# Patient Record
Sex: MALE | Race: WHITE
[De-identification: names, ages, dates, MRNs, and addresses within clinical notes are randomized per-mention and may not be internally consistent; named-entity substitution may affect disease eponyms.]

---

## 2018-02-15 ENCOUNTER — HOSPITAL ENCOUNTER (OUTPATIENT)
Dept: HOSPITAL 62 - ER | Age: 53
Setting detail: OBSERVATION
LOS: 1 days | Discharge: HOME | End: 2018-02-16
Attending: FAMILY MEDICINE | Admitting: FAMILY MEDICINE
Payer: OTHER GOVERNMENT

## 2018-02-15 DIAGNOSIS — F32.9: ICD-10-CM

## 2018-02-15 DIAGNOSIS — R20.0: ICD-10-CM

## 2018-02-15 DIAGNOSIS — I25.2: ICD-10-CM

## 2018-02-15 DIAGNOSIS — G47.33: ICD-10-CM

## 2018-02-15 DIAGNOSIS — Z90.49: ICD-10-CM

## 2018-02-15 DIAGNOSIS — R20.2: ICD-10-CM

## 2018-02-15 DIAGNOSIS — Z82.49: ICD-10-CM

## 2018-02-15 DIAGNOSIS — I25.110: ICD-10-CM

## 2018-02-15 DIAGNOSIS — E78.1: ICD-10-CM

## 2018-02-15 DIAGNOSIS — H57.02: ICD-10-CM

## 2018-02-15 DIAGNOSIS — E66.01: ICD-10-CM

## 2018-02-15 DIAGNOSIS — F41.9: ICD-10-CM

## 2018-02-15 DIAGNOSIS — Z79.899: ICD-10-CM

## 2018-02-15 DIAGNOSIS — G45.9: Primary | ICD-10-CM

## 2018-02-15 DIAGNOSIS — Z79.02: ICD-10-CM

## 2018-02-15 DIAGNOSIS — Z79.82: ICD-10-CM

## 2018-02-15 DIAGNOSIS — F43.10: ICD-10-CM

## 2018-02-15 DIAGNOSIS — R27.8: ICD-10-CM

## 2018-02-15 DIAGNOSIS — I67.9: ICD-10-CM

## 2018-02-15 DIAGNOSIS — Z95.5: ICD-10-CM

## 2018-02-15 DIAGNOSIS — Z86.73: ICD-10-CM

## 2018-02-15 LAB
ADD MANUAL DIFF: NO
ALBUMIN SERPL-MCNC: 4.8 G/DL (ref 3.5–5)
ALP SERPL-CCNC: 82 U/L (ref 38–126)
ALT SERPL-CCNC: 47 U/L (ref 21–72)
ANION GAP SERPL CALC-SCNC: 13 MMOL/L (ref 5–19)
APTT BLD: 26.5 SEC (ref 23.5–35.8)
APTT BLD: 26.9 SEC (ref 23.5–35.8)
AST SERPL-CCNC: 31 U/L (ref 17–59)
BASOPHILS # BLD AUTO: 0 10^3/UL (ref 0–0.2)
BASOPHILS NFR BLD AUTO: 0.6 % (ref 0–2)
BILIRUB DIRECT SERPL-MCNC: 0.2 MG/DL (ref 0–0.4)
BILIRUB SERPL-MCNC: 0.7 MG/DL (ref 0.2–1.3)
BUN SERPL-MCNC: 18 MG/DL (ref 7–20)
CALCIUM: 10 MG/DL (ref 8.4–10.2)
CHLORIDE SERPL-SCNC: 103 MMOL/L (ref 98–107)
CK MB SERPL-MCNC: 0.64 NG/ML (ref ?–4.55)
CK MB SERPL-MCNC: 0.85 NG/ML (ref ?–4.55)
CK MB SERPL-MCNC: 1.02 NG/ML (ref ?–4.55)
CK SERPL-CCNC: 102 U/L (ref 55–170)
CO2 SERPL-SCNC: 26 MMOL/L (ref 22–30)
EOSINOPHIL # BLD AUTO: 0.1 10^3/UL (ref 0–0.6)
EOSINOPHIL NFR BLD AUTO: 1.2 % (ref 0–6)
ERYTHROCYTE [DISTWIDTH] IN BLOOD BY AUTOMATED COUNT: 13.4 % (ref 11.5–14)
GLUCOSE SERPL-MCNC: 84 MG/DL (ref 75–110)
HCT VFR BLD CALC: 48.5 % (ref 37.9–51)
HGB BLD-MCNC: 16.9 G/DL (ref 13.5–17)
INR PPP: 0.94
INR PPP: 0.94
LYMPHOCYTES # BLD AUTO: 2.8 10^3/UL (ref 0.5–4.7)
LYMPHOCYTES NFR BLD AUTO: 32.2 % (ref 13–45)
MCH RBC QN AUTO: 30.8 PG (ref 27–33.4)
MCHC RBC AUTO-ENTMCNC: 34.9 G/DL (ref 32–36)
MCV RBC AUTO: 88 FL (ref 80–97)
MONOCYTES # BLD AUTO: 0.9 10^3/UL (ref 0.1–1.4)
MONOCYTES NFR BLD AUTO: 10.5 % (ref 3–13)
NEUTROPHILS # BLD AUTO: 4.7 10^3/UL (ref 1.7–8.2)
NEUTS SEG NFR BLD AUTO: 55.5 % (ref 42–78)
PLATELET # BLD: 319 10^3/UL (ref 150–450)
POTASSIUM SERPL-SCNC: 4.4 MMOL/L (ref 3.6–5)
PROT SERPL-MCNC: 7.6 G/DL (ref 6.3–8.2)
PROTHROMBIN TIME: 13.2 SEC (ref 11.4–15.4)
PROTHROMBIN TIME: 13.3 SEC (ref 11.4–15.4)
RBC # BLD AUTO: 5.49 10^6/UL (ref 4.35–5.55)
SODIUM SERPL-SCNC: 141.8 MMOL/L (ref 137–145)
TOTAL CELLS COUNTED % (AUTO): 100 %
TROPONIN I SERPL-MCNC: < 0.012 NG/ML
WBC # BLD AUTO: 8.5 10^3/UL (ref 4–10.5)

## 2018-02-15 PROCEDURE — 85610 PROTHROMBIN TIME: CPT

## 2018-02-15 PROCEDURE — 70498 CT ANGIOGRAPHY NECK: CPT

## 2018-02-15 PROCEDURE — 93005 ELECTROCARDIOGRAM TRACING: CPT

## 2018-02-15 PROCEDURE — G0378 HOSPITAL OBSERVATION PER HR: HCPCS

## 2018-02-15 PROCEDURE — 70551 MRI BRAIN STEM W/O DYE: CPT

## 2018-02-15 PROCEDURE — 82553 CREATINE MB FRACTION: CPT

## 2018-02-15 PROCEDURE — 99285 EMERGENCY DEPT VISIT HI MDM: CPT

## 2018-02-15 PROCEDURE — 82550 ASSAY OF CK (CPK): CPT

## 2018-02-15 PROCEDURE — 83036 HEMOGLOBIN GLYCOSYLATED A1C: CPT

## 2018-02-15 PROCEDURE — 80061 LIPID PANEL: CPT

## 2018-02-15 PROCEDURE — 70450 CT HEAD/BRAIN W/O DYE: CPT

## 2018-02-15 PROCEDURE — 36415 COLL VENOUS BLD VENIPUNCTURE: CPT

## 2018-02-15 PROCEDURE — 92610 EVALUATE SWALLOWING FUNCTION: CPT

## 2018-02-15 PROCEDURE — 97163 PT EVAL HIGH COMPLEX 45 MIN: CPT

## 2018-02-15 PROCEDURE — 84484 ASSAY OF TROPONIN QUANT: CPT

## 2018-02-15 PROCEDURE — 93010 ELECTROCARDIOGRAM REPORT: CPT

## 2018-02-15 PROCEDURE — 84443 ASSAY THYROID STIM HORMONE: CPT

## 2018-02-15 PROCEDURE — 80053 COMPREHEN METABOLIC PANEL: CPT

## 2018-02-15 PROCEDURE — 85025 COMPLETE CBC W/AUTO DIFF WBC: CPT

## 2018-02-15 PROCEDURE — 71045 X-RAY EXAM CHEST 1 VIEW: CPT

## 2018-02-15 PROCEDURE — 70496 CT ANGIOGRAPHY HEAD: CPT

## 2018-02-15 PROCEDURE — 82962 GLUCOSE BLOOD TEST: CPT

## 2018-02-15 PROCEDURE — 97167 OT EVAL HIGH COMPLEX 60 MIN: CPT

## 2018-02-15 PROCEDURE — 85730 THROMBOPLASTIN TIME PARTIAL: CPT

## 2018-02-15 RX ADMIN — Medication SCH ML: at 21:59

## 2018-02-15 RX ADMIN — METOPROLOL TARTRATE SCH MG: 25 TABLET, FILM COATED ORAL at 21:57

## 2018-02-15 NOTE — ER DOCUMENT REPORT
ED General





- General


Chief Complaint: S/S of Possible Stroke


Stated Complaint: POSSIBLE STROKE


Time Seen by Provider: 02/15/18 13:11





- HPI


Patient complains to provider of: possible stroke


Notes: 





52-year-old man with history of TIAs in the past presents with concern for 

possible TIA.  Patient is endorsing left upper extremity and left lower 

extremity decreased sensation and tingling.  Patient thinks it started last 

night.  Patient states he woke up this morning continue to have symptoms.  Went 

to his PTSD support group.  There the symptoms got worse.  Patient presents NIH 

stroke scale 1.  Speaking fluently no facial asymmetry.  Denies nausea or 

vomiting.





- Related Data


Allergies/Adverse Reactions: 


 





No Known Allergies Allergy (Verified 02/15/18 13:51)


 











Past Medical History





- Social History


Smoking Status: Unknown if Ever Smoked


Family History: Reviewed & Not Pertinent





- Past Medical History


Cardiac Medical History: Reports: Hx Heart Attack - with stent placement, Hx 

Hypercholesterolemia


Pulmonary Medical History: 


   Denies: Hx Tuberculosis


Past Surgical History: Reports: Hx Appendectomy - 1986.  Denies: Hx Pacemaker





- Immunizations


Hx Diphtheria, Pertussis, Tetanus Vaccination: Yes





Review of Systems





- Review of Systems


Constitutional: No symptoms reported


EENT: No symptoms reported


Cardiovascular: No symptoms reported


Respiratory: No symptoms reported


Gastrointestinal: No symptoms reported


Genitourinary: No symptoms reported


Male Genitourinary: No symptoms reported


Musculoskeletal: No symptoms reported


Skin: No symptoms reported


Hematologic/Lymphatic: No symptoms reported


Neurological/Psychological: Numbness





Physical Exam





- Vital signs


Vitals: 


 











Resp


 


 16 


 


 02/15/18 12:55











Interpretation: Normal





- General


General appearance: Appears well, Alert





- HEENT


Head: Normocephalic, Atraumatic


Eyes: Normal


Pupils: PERRL





- Respiratory


Respiratory status: No respiratory distress


Chest status: Nontender


Breath sounds: Normal


Chest palpation: Normal





- Cardiovascular


Rhythm: Regular


Heart sounds: Normal auscultation


Murmur: No





- Abdominal


Inspection: Normal


Distension: No distension


Bowel sounds: Normal


Tenderness: Nontender


Organomegaly: No organomegaly





- Back


Back: Normal, Nontender





- Extremities


General upper extremity: Normal inspection, Nontender, Normal color, Normal ROM

, Normal temperature


General lower extremity: Normal inspection, Nontender, Normal color, Normal ROM

, Normal temperature, Normal weight bearing.  No: Isaac's sign





- Neurological


Neuro grossly intact: Yes


Cognition: Normal


Orientation: AAOx4


[place] Coma Scale Eye Opening: Spontaneous


[place] Coma Scale Verbal: Oriented


[place] Coma Scale Motor: Obeys Commands


[place] Coma Scale Total: 15


Speech: Normal


Motor strength normal: LUE, RUE, LLE, RLE


Sensory: Normal





- Psychological


Associated symptoms: Normal affect, Normal mood





- Skin


Skin Temperature: Warm


Skin Moisture: Dry


Skin Color: Normal





Course





- Re-evaluation


Re-evalutation: 





02/15/18 14:07


Patient not a candidate for TPA outside window symptoms started about 6 hours 

ago.  Patient also actually says it symptoms last night before bed.  Extensive 

workup here unremarkable CAT scan shows no acute stroke.  Patient be admitted 

to the hospital for further management of stroke, MRI, echocardiogram, 

ultrasound carotid





- Vital Signs


Vital signs: 


 











Temp Pulse Resp BP Pulse Ox


 


       12   123/84   96 


 


       02/15/18 13:31  02/15/18 13:31  02/15/18 13:31














- Laboratory


Result Diagrams: 


 02/15/18 12:32





 02/15/18 12:32





- EKG Interpretation by Me


Additional EKG results interpreted by me: 





02/15/18 14:06


Normal sinus rhythm, no ST elevations or depressions, normal QRS





Discharge





- Discharge


Clinical Impression: 


 CVA (cerebral vascular accident)





Condition: Stable


Disposition: ADMITTED AS INPATIENT


Admitting Provider: Hospitalist [facility]


Unit Admitted: Telemetry

## 2018-02-15 NOTE — RADIOLOGY REPORT (SQ)
EXAM DESCRIPTION:  CHEST SINGLE VIEW



COMPLETED DATE/TIME:  2/15/2018 12:54 pm



REASON FOR STUDY:  bed 12 stroke alert



COMPARISON:  December 2013



EXAM PARAMETERS:  NUMBER OF VIEWS: One view.

TECHNIQUE: Single frontal radiographic view of the chest acquired.

RADIATION DOSE: NA

LIMITATIONS: None.



FINDINGS:  LUNGS AND PLEURA: No opacities, masses or pneumothorax. No pleural effusion.  Tiny pulmona
ry nodule projected in the left mid lung field appears stable.  Linear scarring or subsegmental atele
ctasis is identified in the left costophrenic angle.

MEDIASTINUM AND HILAR STRUCTURES: No masses.  Contour normal.

HEART AND VASCULAR STRUCTURES: Heart normal in size.  Normal vasculature.

BONES: No acute findings.

HARDWARE: None in the chest.

OTHER: No other significant finding.



IMPRESSION:  NO ACUTE RADIOGRAPHIC FINDING IN THE CHEST.



TECHNICAL DOCUMENTATION:  JOB ID:  [id number]

 2011 [employer]- All Rights Reserved

## 2018-02-15 NOTE — RADIOLOGY REPORT (SQ)
EXAM DESCRIPTION:  CTA NECK



COMPLETED DATE/TIME:  2/15/2018 5:58 pm



REASON FOR STUDY:  CVA



COMPARISON:  None.



TECHNIQUE:  Axial dynamic scanning technique with  dynamic contrast enhancement through the extra-cra
nial carotid and vertebral  arteries.  Multiplanar reconstruction.  3-D MIPS and Volume-rendered imag
es  acquired at the workstation and saved to PACS.  Images are reviewed in soft  tissue, bone, lung w
indows.

All CT scanners at this facility use dose modulation, iterative reconstruction, and/or weight based d
osing when appropriate to reduce radiation dose to as low as reasonably achievable (ALARA).

[facility]: Dose Right  [facility]: CareDose    [facility]: Dose Right    [facility]: Teradose 4D    [facility]: Smart Technologies



CONTRAST TYPE AND DOSE:  contrast/concentration: Isovue 370.00 mg/ml; Total Contrast Delivered: 70.0 
ml; Total Saline Delivered: 75.0 ml



RENAL FUNCTION:  Creatinine 1.25



LIMITATIONS:  None.



FINDINGS:  AORTIC ARCH: Normal three-vessel origin.  Bilateral subclavian arteries are patent.  No  d
issection.

RIGHT CAROTIDS: Patent common, internal and external carotid arteries without suggestion of significa
nt stenosis or irregular plaque.  No dissection.

RIGHT VERTEBRAL: Patent.  No dissection.

LEFT CAROTIDS: Patent common, internal and external carotid arteries without suggestion of significan
t stenosis or irregular plaque.  No dissection.

LEFT VERTEBRAL: Patent.  No dissection.

OTHER: Multiple bilateral prominent cervical lymph nodes are identified

OTHER: 3-D  reconstructions confirm findings.



IMPRESSION:  NORMAL CTA OF THE EXTRA-CRANIAL CAROTID AND VERTEBRAL ARTERIES.



COMMENT:  Quality ID #195: Measurements of distal internal carotid diameter were used as the denomina
tor for stenosis measurement.



TECHNICAL DOCUMENTATION:  JOB ID:  [id number]

Quality ID # 436: Final reports with documentation of one or more dose reduction techniques (e.g., Au
tomated exposure control, adjustment of the mA and/or kV according to patient size, use of iterative 
reconstruction technique)

 2011 [employer]- All Rights Reserved

## 2018-02-15 NOTE — RADIOLOGY REPORT (SQ)
EXAM DESCRIPTION:  CTA HEAD



COMPLETED DATE/TIME:  2/15/2018 5:59 pm



REASON FOR STUDY:  CVA



COMPARISON:  None.



TECHNIQUE:  Post IV contrast scanning, thin section axial imaging through the brain to evaluate the a
rterial structures.  Source and MIP images are saved and reviewed on PACS.

Advanced 3D imaging as volume-rendering, MIPs, SSD performed? yes

All CT scanners at this facility use dose modulation, iterative reconstruction, and/or weight based d
osing when appropriate to reduce radiation dose to as low as reasonably achievable (ALARA).

[facility]: Dose Right  [facility]: CareDose    [facility]: Dose Right    [facility]: Teradose 4D    [facility]: Smart Technologies



CONTRAST TYPE AND DOSE:  70 mL Isovue 370



RENAL FUNCTION:  Creatinine 1.25



LIMITATIONS:  None.



FINDINGS:  [place] OF [place]: The anterior, middle, posterior cerebral arteries are all patent.  No ev
idence of aneurysm or focal stenosis.

POSTERIOR CIRCULATION: The distal vertebral arteries are patent as is the basilar artery. No aneurysm
.

BRAIN: No gross enhancing lesions as visualized.  The superior cerebral hemispheres are not included 
in the field of view.

BONES: Intact as visualized.

SINUSES: No fluid or mucosal thickening.

OTHER: No other significant finding.



IMPRESSION:  NO CTA EVIDENCE OF STENOSIS OR ANEURYSM OF THE [place].



TECHNICAL DOCUMENTATION:  JOB ID:  [id number]

Quality ID # 436: Final reports with documentation of one or more dose reduction techniques (e.g., Au
tomated exposure control, adjustment of the mA and/or kV according to patient size, use of iterative 
reconstruction technique)

 2011 [employer]- All Rights Reserved

## 2018-02-15 NOTE — PDOC H&P
History of Present Illness


Admission Date/PCP: 


  02/15/18 14:22





  





Patient complains of: Tingling in the left arm and left leg.


History of Present Illness: 


EMANI FAM is a 52 year old male known coronary artery disease and 

cerebrovascular disease.  The patient has had several days of increasing 

tingling in the left upper extremity.  When he woke up this morning he still 

had tingling in the left upper extremity.  He also described worsening chest 

pressure.  His chest pressure occurred at rest.  He does have a history of 

coronary disease and has had intermittent unstable angina but he does have 

chronic chest pressure and angina.  He also describes other symptoms of his 

head burning.  This is not a new complaint for the patient.  He went to his 

posttraumatic stress disorder meeting and when he got up to leave he felt dizzy 

and lightheaded.  Then, the left side of his mouth became numb.  Many of his 

symptoms are not new but have been waxing and waning over several months.  Many 

of his PTSD medications were recently discharged by the [facility].  Approximately 9 months ago he was an inpatient at [place] 

for psychiatric illness.  The patient has had 3 heart catheterizations and has 

5 cardiac stents in place.  Prior to this event he has had 2 transient ischemic 

attacks.








Past Medical History


Cardiac Medical History: Reports: Myocardial Infarction - with stent placement, 

Hyperlipidema, Hypertension


Pulmonary Medical History: Reports: Sleep Apnea


   Denies: Tuberculosis


Psychiatric Medical History: Reports: Depression - anxiety, Post Traumatic 

Stress Disorder





Past Surgical History


Past Surgical History: Reports: Appendectomy - , Cardiac Catheterization


   Denies: Pacemaker





Social History


Information Source: Patient


Smoking Status: Never Smoker


Frequency of Alcohol Use: Rare


Hx Recreational Drug Use: No


Drugs: None


Hx Prescription Drug Abuse: No





- Advance Directive


Resuscitation Status: Full Code


Surrogate healthcare decision maker:: 


The patient states that his wife Alessandra Fam is his surrogate decision 

maker.  She can be reached at [phone].








Family History


Family History: Reviewed & Not Pertinent


Parental Family History Reviewed: Yes - Father  of MI at age 57, mother had 

triple bypass, sister has coronary 


Children Family History Reviewed: Yes


Sibling(s) Family History Reviewed.: Yes





Medication/Allergy


Home Medications: 








Alprazolam 0.5 mg PO BID PRN 12 


Fluoxetine HCl [Sarafem] 40 mg PO DAILY 12 


Topiramate [Topiragen] 25 - 100 mg PO QHS 12 








Allergies/Adverse Reactions: 


 





No Known Allergies Allergy (Verified 02/15/18 13:51)


 











Review of Systems


Constitutional: PRESENT: fatigue, weakness


Eyes: PRESENT: visual disturbances


Ears: ABSENT: hearing changes


Nose, Mouth, and Throat: PRESENT: headache(s)


Breasts: ABSENT: as per HPI, other


Cardiovascular: PRESENT: chest pain, dyspnea on exertion


Respiratory: PRESENT: dyspnea


Gastrointestinal: ABSENT: as per HPI, abdominal pain, bloating, coffee ground 

emesis, constipation, diarrhea, dysphagia, heartburn, hematemesis, hematochezia

, melena, nausea, vomiting, other


Genitourinary: PRESENT: nocturia


Musculoskeletal: PRESENT: muscle weakness


Integumentary: ABSENT: as per HPI, diaphoresis, erythema, lesions, pruritus, 

rash, wounds, other


Neurological: PRESENT: abnormal speech, confusion, dizziness, lack of 

coordination, memory loss, numbness, paresthesias, weakness


Psychiatric: PRESENT: anxiety, depression


Endocrine: PRESENT: polyuria


Hematologic/Lymphatic: ABSENT: as per HPI, easy bleeding, easy bruising, 

lymphadenopathy, other


Allergic/Immunologic: ABSENT: as per HPI, seasonal rhinorrhea, other





Physical Exam


Vital Signs: 


 











Temp Pulse Resp BP Pulse Ox


 


 98.6 F   70   14   119/81   97 


 


 02/15/18 15:28  02/15/18 15:31  02/15/18 15:28  02/15/18 15:28  02/15/18 15:28











Additional comments: 


The patient is an obese white male.  He is conversant and answers questions 

appropriately and he is able to follow all commands.  His level of alertness is 

normal.  He did get up and walk from the hallway into his room and he was able 

to get into his bed without any assistance.  The patient's facial appearance is 

noteworthy for anisocoria.  The right pupil is approximately 2 mm larger than 

the left pupil.  Both constrict to light briskly.  The oropharynx demonstrates 

a Mallampati 3 airway.  His neck is enlarged but otherwise supple.  I was 

unable to palpate his thyroid gland.  His lungs are clear to auscultation 

bilaterally.  His cardiac exam is regular without murmurs, gallops or rubs.  

The abdomen is obese but soft.  Bowel sounds are present in the lower 

quadrants.  No guarding or rebound is noted and there are no hernias or masses 

present.  The lower extremities are warm to touch without edema.  The skin is, 

warm, dry and intact without lesions or rashes.  The patient's right upper 

extremity is of normal strength with a normal deep tendon reflex.  The right 

lower extremity is of normal strength with normal deep tendon reflex.  Left 

lower extremity is of normal strength with a deep tendon reflex is diminished.  

Left upper extremity shows some cogwheeling with motion.  However, patient 

moves all 4 extremities intermittently and purposefully.  The patient's facial 

appearance does not demonstrate an obvious droop.  He is not slurring his 

words.  His addiction appears to be normal.  Initially, his tongue was deviated 

to the right, but then it was midline.  He does appear to have weakened 

abducens nerve on the left.








Results


Impressions: 


 





Head CT  02/15/18 00:00


IMPRESSION:  Small focal relative low density area in the periventricular white 

matter on the left as noted above which has the appearance of a lacunar 

infarct.  No other significant intracranial abnormalities were identified.  

Other findings as noted above


 








Chest X-Ray  02/15/18 12:45


IMPRESSION:  NO ACUTE RADIOGRAPHIC FINDING IN THE CHEST.


 














Assessment & Plan





- Diagnosis


(1) CVA (cerebral vascular accident)


Is this a current diagnosis for this admission?: Yes   


Plan: 


The patient's head CT does show a lacunar infarct.  Chronicity is unknown.  I 

have ordered a stat CTA of the head and neck based on his asymmetrical pupils.  

The patient will be maintained on aspirin and Effient.  I have increased his 

dose of atorvastatin.  He will undergo PT, OT and speech therapy evaluations.  

Depending on the results of the CTA of the head and neck I may or may not order 

carotid Doppler.  I am not going to order an echocardiogram because the patient 

has had an echocardiogram last week and we will obtain these results if needed.

  This only demonstrated mild LV dysfunction, but the whole report was not 

available to me.








(2) Coronary artery disease


Is this a current diagnosis for this admission?: Yes   


Plan: 


We will continue aspirin, Effient, atorvastatin and metoprolol.  I was unable 

to verify the patient's medication dosages but this will be confirmed when they 

are available.








(3) Obstructive sleep apnea


Is this a current diagnosis for this admission?: Yes   


Plan: 


The patient will use his CPAP machine while he is here.  His wife is going to 

bring it in.








(4) Morbid obesity


Is this a current diagnosis for this admission?: Yes   


Plan: 


The patient takes metformin at home.  I suspect that he has metabolic syndrome.

  Here, I am going to hold metformin because he will receive IV contrast.  He 

will get fingersticks and be placed on sliding scale insulin protocol.








(5) Posttraumatic stress disorder


Is this a current diagnosis for this admission?: Yes   


Plan: 


Continue Prozac and Xanax.  Continue Ambien for sleep.








- Time


Time Spent: 30 to 50 Minutes





- Inpatient Certification


Medical Necessity: Failure to Improve With Outpatient Therapy, Significant 

Comorbidiites Make Outpatient Treatment Too Risky, Need Close Monitoring Due to 

Risk of Patient Decompensation, Need for Neurological Checks, Risk of 

Complication if Not Cared For in Hospital, Risk of Diagnosis Which Will Require 

Inpatient Eval/Care/Monitoring





- Plan Summary


Plan Summary: 


The patient will be admitted to observation.  He will undergo the workup as 

described above.  I anticipate that he will be here less than 2 midnights.

## 2018-02-15 NOTE — RADIOLOGY REPORT (SQ)
EXAM DESCRIPTION:  CT HEAD WITHOUT



COMPLETED DATE/TIME:  2/15/2018 12:49 pm



REASON FOR STUDY:  STROKE



COMPARISON:  None.



TECHNIQUE:  Axial images acquired through the brain without intravenous contrast.  Images reviewed wi
th bone, brain and subdural windows.  Images stored on PACS.

All CT scanners at this facility use dose modulation, iterative reconstruction, and/or weight based d
osing when appropriate to reduce radiation dose to as low as reasonably achievable (ALARA).

[facility]: Dose Right  [facility]: CareDose    [facility]: Dose Right    [facility]: Teradose 4D    [facility]: [employer]



RADIATION DOSE:   mGy.



LIMITATIONS:  None.



FINDINGS:  VENTRICLES: Normal size and contour.

CEREBRUM: No masses.  No hemorrhage.  No midline shift.  There is a small focal relative low density 
area in the periventricular white matter on the left adjacent to the lateral ventricle which has the 
appearance of lacunar infarct. Normal gray/white matter differentiation.

CEREBELLUM: No masses.  No hemorrhage.  No alteration of density.  No evidence for acute infarction.

EXTRAAXIAL SPACES: No fluid collections.  No masses.

ORBITS AND GLOBE: No intra- or extraconal masses.  Normal contour of globe without masses.

CALVARIUM: No fracture.

PARANASAL SINUSES: No fluid or mucosal thickening.

SOFT TISSUES: No mass or hematoma.

OTHER: No other significant finding.



IMPRESSION:  Small focal relative low density area in the periventricular white matter on the left as
 noted above which has the appearance of a lacunar infarct.  No other significant intracranial abnorm
alities were identified.  Other findings as noted above



COMMENT:  Quality ID # 436: Final reports with documentation of one or more dose reduction techniques
 (e.g., Automated exposure control, adjustment of the mA and/or kV according to patient size, use of 
iterative reconstruction technique)



TECHNICAL DOCUMENTATION:  JOB ID:  [id number]

 2011 [employer]- All Rights Reserved

## 2018-02-16 VITALS — SYSTOLIC BLOOD PRESSURE: 119 MMHG | DIASTOLIC BLOOD PRESSURE: 81 MMHG

## 2018-02-16 LAB
CHOLEST SERPL-MCNC: 219.11 MG/DL (ref 0–200)
CK MB SERPL-MCNC: 0.59 NG/ML (ref ?–4.55)
LDLC SERPL DIRECT ASSAY-MCNC: 141 MG/DL (ref ?–100)
TRIGL SERPL-MCNC: 191 MG/DL (ref ?–150)
TROPONIN I SERPL-MCNC: < 0.012 NG/ML
VLDLC SERPL CALC-MCNC: 38.2 MG/DL (ref 10–31)

## 2018-02-16 RX ADMIN — METOPROLOL TARTRATE SCH MG: 25 TABLET, FILM COATED ORAL at 10:49

## 2018-02-16 RX ADMIN — Medication SCH ML: at 05:29

## 2018-02-16 RX ADMIN — Medication SCH ML: at 15:01

## 2018-02-16 NOTE — EKG REPORT
SEVERITY:- ABNORMAL ECG -

SINUS RHYTHM

NONSPECIFIC INTRAVENTRICULAR CONDUCTION DELAY

BORDERLINE INFERIOR Q WAVES

:

Confirmed by: Sherif Guevara 16-Feb-2018 11:05:23

## 2018-02-16 NOTE — EKG REPORT
SEVERITY:- BORDERLINE ECG -

SINUS BRADYCARDIA

BORDERLINE INFERIOR Q WAVES

:

Confirmed by: Sherif Guevara 16-Feb-2018 11:05:17

## 2018-02-16 NOTE — RADIOLOGY REPORT (SQ)
EXAM DESCRIPTION:  MRI HEAD WITHOUT



COMPLETED DATE/TIME:  2/16/2018 9:02 am



REASON FOR STUDY:  acute neurologic syndrome



COMPARISON:  None.



TECHNIQUE:  Multiplanar imaging includes non-contrasted T1, T2, FLAIR, and diffusion with ADC map seq
uences. Images stored on PACS.



LIMITATIONS:  None.



FINDINGS:  ANATOMY: No anomalies. Normal vascular flow voids. Pituitary fossa normal.

CSF SPACES: Normal in size and contour. No hemorrhage.

CEREBRUM: Sulci and gyri normal in size and contour. Normal white matter signal on FLAIR imaging.  No
 evidence of hemorrhage, mass, or extraaxial fluid collection.

POSTERIOR FOSSA: No signal alteration. No hemorrhage. No edema, masses or mass effect.  Internal mg
tory canals, cerebello-pontine angles, mastoids normal.

DIFFUSION IMAGING: Negative for acute or sub-acute infarction.

ORBITS: No masses. Globes normal.

PARANASAL  SINUSES: No fluid levels.  Mucosa normal.

OTHER: No other significant finding.



IMPRESSION:  NORMAL MRI OF THE BRAIN WITHOUT INTRAVENOUS GADOLINIUM CONTRAST.

EVIDENCE OF ACUTE STROKE: NO.



TECHNICAL DOCUMENTATION:  JOB ID:  [id number]

 2011 [employer] All Rights Reserved

## 2018-02-16 NOTE — PDOC DISCHARGE SUMMARY
General





- Admit/Disc Date/PCP


Admission Date/Primary Care Provider: 


  02/15/18 14:22





  





Discharge Date: 02/16/18





- Discharge Diagnosis


(1) Transient ischemic attack (TIA)


Is this a current diagnosis for this admission?: Yes   





(2) CVA (cerebral vascular accident)


Is this a current diagnosis for this admission?: No   





(3) Coronary artery disease


Is this a current diagnosis for this admission?: Yes   





(4) Obstructive sleep apnea


Is this a current diagnosis for this admission?: Yes   





(5) Morbid obesity


Is this a current diagnosis for this admission?: Yes   





(6) Posttraumatic stress disorder


Is this a current diagnosis for this admission?: Yes   





- Additional Information


Resuscitation Status: Full Code


Discharge Diet: Other (Comments) - Low fat, low sodium diet


Prescriptions: 


Atorvastatin Calcium [Lipitor 40 mg Tablet] 80 mg PO QPM 30 Days #60 tablet


Home Medications: 








Alprazolam [Xanax] 1 mg PO BID 02/16/18 


Aripiprazole [Abilify 10 mg Tablet] 5 mg PO DAILY 02/16/18 


Aspirin [Aspirin 81 mg Chewable Tablet] 81 mg PO DAILY 02/16/18 


Atorvastatin Calcium [Lipitor 40 mg Tablet] 80 mg PO QPM 30 Days #60 tablet 02/ 16/18 


Fluoxetine HCl [Prozac] 40 mg PO DAILY 02/16/18 


Metoprolol Succinate [Toprol Xl] 25 mg PO DAILY 02/16/18 


Prasugrel HCl [Effient 10 mg Tablet] 10 mg PO DAILY 02/16/18 


Zolpidem Tartrate [Ambien] 10 mg PO QHS 02/16/18 











History of Present Illness


History of Present Illness: 


EMANI FAM is a 52 year old male known coronary artery disease and 

cerebrovascular disease.  The patient has had several days of increasing 

tingling in the left upper extremity.  When he woke up this morning he still 

had tingling in the left upper extremity.  He also described worsening chest 

pressure.  His chest pressure occurred at rest.  He does have a history of 

coronary disease and has had intermittent unstable angina but he does have 

chronic chest pressure and angina.  He also describes other symptoms of his 

head burning.  This is not a new complaint for the patient.  He went to his 

posttraumatic stress disorder meeting and when he got up to leave he felt dizzy 

and lightheaded.  Then, the left side of his mouth became numb.  Many of his 

symptoms are not new but have been waxing and waning over several months.  Many 

of his PTSD medications were recently discontinued  by the [facility] 

[facility].  Approximately 9 months ago he was an inpatient at [facility] 

for psychiatric illness.  The patient has had 3 heart catheterizations and has 

5 cardiac stents in place.  Prior to this event he has had 2 transient ischemic 

attacks.








Hospital Course


Hospital Course: 


The patient presented with signs and symptoms concerning for a transient 

ischemic attack versus stroke.  The patient's initial head CT was concerning 

for a subacute lacunar infarct.  Further testing included a CT angiogram of the 

head and neck.  The [place] is noted to be normal without aneurysmal 

dilatation or other defects.  The patient does not have any significant carotid 

stenosis.  The patient was monitored on telemetry.  He did have periods of 

sinus bradycardia, but per his report this is not new.  I did not perform an 

echocardiogram because the patient had an echocardiogram last week.  He was 

notified that the results demonstrate a low normal ejection fraction.  The 

patient will need to follow-up with his cardiologist regarding the 

echocardiogram.  During this hospitalization lab work indicates that his 

cholesterol profile is not optimized.  His triglyceride level is elevated.  His 

LDL is elevated and his HDL is low for his underlying coronary artery disease.  

Therefore, I have increased his dose of atorvastatin from 40 mg per day to 80 

mg per day.  The patient has a normal hemoglobin A1c of 5.7.  At this point 

time I have not renewed his metformin.  He will need to follow-up with his 

primary care doctor and have his creatinine checked prior to reinitiating the 

metformin.  This is secondary to the fact that he received IV contrast for the 

angiograms.  On the day of discharge the patient does appear to have persistent 

numbness and tingling particularly of the left upper extremity with a question 

of pronator drift on the left.  He may have some cervical disc disease and 

spinal stenosis.  I have recommended that he follow-up with his primary care 

manager for further evaluation.  Upon discharge dietary counseling was given.  

The patient does not eat an appropriate diet.  He frequently eats fast foods 

that are high in fat and sodium.  I did recommend that the patient try to 

change his diet to incorporate more fresh fruits and vegetables and avoid foods 

high in fat and sodium.








Physical Exam


Vital Signs: 


 











Temp Pulse Resp BP Pulse Ox


 


 98.4 F   60   18   99/71 L  97 


 


 02/16/18 07:53  02/16/18 08:00  02/16/18 08:00  02/16/18 08:00  02/16/18 08:00








 Intake & Output











 02/15/18 02/16/18 02/17/18





 06:59 06:59 06:59


 


Intake Total  220 


 


Balance  220 


 


Weight  126.7 kg 











Additional comments: 


Again, the patient is noted to have anisocoria.  The left pupil is about 2 mm 

smaller than the right.  Otherwise, the patient's facial appearance is 

unremarkable.  The patient's cognition and mentation are normal.  The patient's 

lungs are clear to auscultation bilaterally.  His cardiac exam is regular 

without murmurs, gallops or rubs.  The abdomen is obese but soft.  Bowel sounds 

are present in the lower quadrants.  He does not have any guarding or rebound 

noted.  The lower extremities are unremarkable without pitting edema.  The skin 

is warm, dry and intact without lesions or rashes.  The patient is easily able 

to follow commands.  He can move all 4 extremities.  The patient continues to 

complain of sensation changes around the chin on the left and over the left 

upper extremity.  He has mild pronator drift on the left.  His gait and station 

however are completely normal.  His strength is also normal in all 4 

extremities.  Initially, strength appeared to be less in the left upper 

extremity but with reinforcement his strength appears to be normal.








Results


Laboratory Results: 


 











  02/16/18 02/16/18





  04:29 04:29


 


Triglycerides  191 H 


 


Cholesterol  219.11 H 


 


LDL Cholesterol Direct  141 H 


 


VLDL Cholesterol  38.2 H 


 


HDL Cholesterol  36 L 


 


TSH   1.76








 











  02/15/18 02/15/18 02/15/18





  16:35 16:35 22:35


 


Creatine Kinase  89   79


 


CK-MB (CK-2)   0.85 


 


Troponin I   < 0.012 














  02/15/18 02/16/18 02/16/18





  22:35 04:29 04:29


 


Creatine Kinase   73 


 


CK-MB (CK-2)  0.64   0.59


 


Troponin I  < 0.012   < 0.012











Impressions: 


 





Head CT  02/15/18 00:00


IMPRESSION:  Small focal relative low density area in the periventricular white 

matter on the left as noted above which has the appearance of a lacunar 

infarct.  No other significant intracranial abnormalities were identified.  

Other findings as noted above


 








Head CTA  02/15/18 00:00


IMPRESSION:  NO CTA EVIDENCE OF STENOSIS OR ANEURYSM OF THE [place].


 








Neck CTA  02/15/18 00:00


IMPRESSION:  NORMAL CTA OF THE EXTRA-CRANIAL CAROTID AND VERTEBRAL ARTERIES.


 








Chest X-Ray  02/15/18 12:45


IMPRESSION:  NO ACUTE RADIOGRAPHIC FINDING IN THE CHEST.


 








Head MRI  02/16/18 07:30


IMPRESSION:  NORMAL MRI OF THE BRAIN WITHOUT INTRAVENOUS GADOLINIUM CONTRAST.


EVIDENCE OF ACUTE STROKE: NO.


 














Plan


Discharge Plan: 


1.  Follow-up with primary care manager.  Follow-up testing for cervical disc 

disease can be considered.


2.  Follow-up with cardiology as scheduled.


3.  Patient was told to return to the emergency department for worsening chest 

pain, paresthesias or weakness.





Time Spent: Less than 30 Minutes